# Patient Record
Sex: FEMALE | Race: WHITE | Employment: OTHER | URBAN - METROPOLITAN AREA
[De-identification: names, ages, dates, MRNs, and addresses within clinical notes are randomized per-mention and may not be internally consistent; named-entity substitution may affect disease eponyms.]

---

## 2021-02-20 ENCOUNTER — HOSPITAL ENCOUNTER (EMERGENCY)
Facility: CLINIC | Age: 36
Discharge: HOME | End: 2021-02-20
Attending: PHYSICIAN ASSISTANT | Admitting: PHYSICIAN ASSISTANT
Payer: COMMERCIAL

## 2021-02-20 VITALS
OXYGEN SATURATION: 100 % | TEMPERATURE: 96.7 F | SYSTOLIC BLOOD PRESSURE: 125 MMHG | RESPIRATION RATE: 18 BRPM | HEART RATE: 100 BPM | DIASTOLIC BLOOD PRESSURE: 86 MMHG

## 2021-02-20 DIAGNOSIS — S61.012A THUMB LACERATION, LEFT, INITIAL ENCOUNTER: ICD-10-CM

## 2021-02-20 PROCEDURE — 99283 EMERGENCY DEPT VISIT LOW MDM: CPT

## 2021-02-20 PROCEDURE — 250N000013 HC RX MED GY IP 250 OP 250 PS 637: Performed by: PHYSICIAN ASSISTANT

## 2021-02-20 PROCEDURE — 272N000397 HC DRESSING QUICK CLOT 4X4

## 2021-02-20 RX ORDER — ACETAMINOPHEN 325 MG/1
650 TABLET ORAL ONCE
Status: COMPLETED | OUTPATIENT
Start: 2021-02-20 | End: 2021-02-20

## 2021-02-20 RX ADMIN — ACETAMINOPHEN 650 MG: 325 TABLET, FILM COATED ORAL at 18:26

## 2021-02-20 ASSESSMENT — ENCOUNTER SYMPTOMS: WOUND: 1

## 2021-02-20 NOTE — ED TRIAGE NOTES
Pt arrives with L thumb avulsion from cutting vegetables in kitchen 20 min PTA. Bleeding controlled with direct pressure. ABCs intact.

## 2021-02-20 NOTE — ED PROVIDER NOTES
History     Chief Complaint:  Laceration     HPI:   Beckie Gonzalez is a 35 year old female who presents with an avulsion of her left thumb. Patient states that she was cutting vegetables with a knife about 20 minutes ago when she sustained this avulsion. Bleeding is controlled with direct pressure. Last tetanus 2017. The patient denied mild tenderness to the area of involvement however she denied any additional medical concerns.     Review of Systems   Skin: Positive for wound.   All other systems reviewed and are negative.       Allergies:  Penicillin V  Vancomycin     Medications:    Celexa  Percocet  Flomax    Past Medical History:    Anxiety   Depression   Kidney stones    Past Surgical History:    Breast surgery  Vitamin D deficiency   tonsillectomy     Family History:    Hypertension   hyperlipidemia   Heart disease   Anemia   Sjogren's syndrome  Diabetes  Cancer  GI disease   disease  Osteoporosis  Cancer  Alzheimer's disease  MI    Social History:  Smoking status: no  Alcohol use: occasional  Patient presents with .    Physical Exam     Vitals:  Patient Vitals for the past 24 hrs:   BP Temp Temp src Pulse Resp SpO2   02/20/21 1800 125/86 -- -- -- -- --   02/20/21 1726 (!) 123/103 96.7  F (35.9  C) Temporal 100 18 100 %       Physical Exam   Vitals signs and nursing note reviewed.   HENT:      Nose: Nose normal. No congestion or rhinorrhea.      Mouth/Throat:      Mouth: Mucous membranes are moist.  Eyes:      General: No scleral icterus.     Extraocular Movements: Extraocular movements intact.      Conjunctiva/sclera: Conjunctivae normal.   Cardiovascular:      Rate and Rhythm: Regular rhythm. Normal Rate.     Pulses: Normal pulses.      Heart sounds: Normal heart sounds.   Pulmonary:      Effort: Pulmonary effort is normal.      Breath sounds: Normal breath sounds.   Musculoskeletal: Normal range of motion of finger left hand. Left hand:Normal sensation in fingers.   Skin:     Approximately 1 cm  avulsion laceration to the distal left thumb. Wound explored through bloodless field. No bone involvement. Normal flexion and extension of left thumb. Normal abduction and opposition.   Neurological:      Mental Status: Normal speech. Responds appropriately to questions.   Psychiatric:         Mood and Affect: Mood normal.         Behavior: Behavior normal.     Emergency Department Course     Procedures:  Avulsion:   Cleaned with normal saline.    SurgiFoam applied to wound as well as a dressing.     Emergency Department Course:  Reviewed:  1730  Past medical records, nursing notes, and vitals reviewed.     Assessments:  1740 I performed an exam of the patient and obtained history, as documented above.    Disposition:  1804 The patient was discharged to home.     I answered all related questions prior to discharge.     Impression & Plan     Medical Decision Making:  Beckie Gonzalez is a 35 year old female who presents to the emergency department today for evaluation of a left thumb avulsion as noted in the HPI. Vitals were reviewed. On physical exam, the patient had a left thumb avulsion as described above.  The wound was carefully evaluated and explored through a bloodless field. The avulsion was cleaned with normal saline and then treated with SurgiFoam before dressing placement.  At this point there is no evidence of muscular, tendon, bony damage or concern for foreign body within this wound.  I have recommended that the patient keep the wound dry for 24-48 hours and then avoid submersion for 1 week. The patient understands to watch for signs of symptoms of infection. She was advised to follow up with her PCP next week for reassessment. The patient was encouraged to return to the ED if she develops fever, swelling of the finger, abnormal drainage from the area, or any other medical concerns. All questions and concerns were addressed prior to discharge.     Diagnosis:    ICD-10-CM    1. Thumb laceration, left,  initial encounter  S61.012A     avulsion        Discharge Medications:  New Prescriptions    No medications on file       Scribe Disclosure:  I, Sarah Cheung, am serving as a scribe at 5:34 PM on 2/20/2021 to document services personally performed by Uma Cedeno PA-C based on my observations and the provider's statements to me.       Sarah Cheung  2/20/2021    Uma Cedeno PA-C  02/20/21 1828

## 2021-02-21 NOTE — ED NOTES
Emergency Department Technician Wound Irrigation Note:    2/20/2021    6:06 PM      Wound location:  Left thumb    Irrigation Fluid: Saline    Estimated Irrigation Volume (60 mL fluid per cm): 100 mL    Latoya Hutchins